# Patient Record
Sex: FEMALE | Race: WHITE
[De-identification: names, ages, dates, MRNs, and addresses within clinical notes are randomized per-mention and may not be internally consistent; named-entity substitution may affect disease eponyms.]

---

## 2017-07-08 NOTE — EDM.PDOC
ED HPI GENERAL MEDICAL PROBLEM





- General


Chief Complaint: General


Stated Complaint: FELL HURT HIP


Time Seen by Provider: 07/07/17 21:40


Source of Information: Reports: Patient, Family (Daughter Malena Samuel)


History Limitations: Reports: No Limitations





- History of Present Illness


INITIAL COMMENTS - FREE TEXT/NARRATIVE: 





fall, pain on left chest and abdomen; this is a 76 year old female, who reports 

fell earlier today at [employer], then while at home, trip and fell onto her left 

side on a large rock/boulder. The object has a pointer end, which is what Mrs. Reyna fell on. Reports "knocked the wind out of me".





denies any LOC


cc left chest and abdomen pain


walking without any difficulties.


Onset: Sudden


Duration: Constant


Location: Reports: Chest, Abdomen


Quality: Reports: Ache, Burning


Severity: Moderate


Improves with: Reports: Eating, Rest


Worsens with: Reports: None


Context: Reports: Trauma (fall two times, once at home and once at a store)


Associated Symptoms: Reports: No Other Symptoms


Treatments PTA: Reports: Acetaminophen


  ** left ribs and left hip


Pain Score (Numeric/FACES): 9





- Related Data


 Allergies











Allergy/AdvReac Type Severity Reaction Status Date / Time


 


dextrose [From Metamucil] Allergy  Cannot Verified 07/07/17 21:17





   Remember  


 


psyllium husk Allergy  Cannot Verified 07/07/17 21:17





[From Metamucil]   Remember  


 


psyllium seed Allergy  Cannot Verified 07/07/17 21:17





[From Metamucil]   Remember  


 


sucrose [From Metamucil] Allergy  Cannot Verified 07/07/17 21:17





   Remember  


 


Sulfa (Sulfonamide Allergy  Cannot Verified 07/07/17 21:17





Antibiotics)   Remember  











Home Meds: 


 Home Meds





DULoxetine HCl [Cymbalta] 30 mg PO DAILY 07/07/17 [History]


Exenatide Microspheres [Bydureon Pen] 2 mg SQ WEEKLY 07/07/17 [History]


Lisinopril/Hydrochlorothiazide [Lisinopril-Hctz 20-12.5 mg Tab] 1 each PO DAILY 

07/07/17 [History]


Omeprazole 40 mg PO DAILY 07/07/17 [History]


Polyethylene Glycol 3350 [MiraLAX] 1 dose PO ASDIRECTED PRN 07/07/17 [History]


atorvaSTATin [Lipitor] 20 mg PO ONETIME 07/07/17 [History]


metFORMIN [Glucophage XR] 500 mg PO DAILY 07/07/17 [History]











Past Medical History


HEENT History: Reports: Cataract


Cardiovascular History: Reports: High Cholesterol, Hypertension


Respiratory History: Reports: COPD


Gastrointestinal History: Reports: Cholelithiasis, GERD


Musculoskeletal History: Reports: Arthritis, Back Pain, Chronic, Fracture, 

Osteoporosis


Psychiatric History: Reports: Depression


Endocrine/Metabolic History: Reports: Diabetes, Type II





- Infectious Disease History


Infectious Disease History: Reports: C-Difficile, Herpes, Measles





- Past Surgical History


HEENT Surgical History: Reports: Cataract Surgery, Tonsillectomy


GI Surgical History: Reports: Appendectomy, Cholecystectomy


Female  Surgical History: Reports: Hysterectomy


Neurological Surgical History: Reports: Discectomy, Lumbar Spine, Spinal Fusion





Social & Family History





- Tobacco Use


Smoking Status *Q: Former Smoker


Used Tobacco, but Quit: Yes


Month Tobacco Last Used: 1981





- Caffeine Use


Caffeine Use: Reports: Coffee





- Recreational Drug Use


Recreational Drug Use: No





- Living Situation & Occupation


Living situation: Reports: 


Occupation: Retired ( to [employer], lives in [place].)





ED ROS GENERAL





- Review of Systems


Review Of Systems: See Below


Constitutional: Reports: Other (painful chest and abdomen.)


HEENT: Reports: No Symptoms


Respiratory: Reports: No Symptoms


Cardiovascular: Reports: No Symptoms


Endocrine: Reports: No Symptoms


GI/Abdominal: Reports: Abdominal Pain


: Reports: No Symptoms


Musculoskeletal: Reports: Back Pain (left chest and ribs), Muscle Pain, Muscle 

Stiffness


Skin: Reports: Bruising (left lateral abdoen)


Neurological: Reports: No Symptoms


Psychiatric: Reports: No Symptoms


Hematologic/Lymphatic: Reports: No Symptoms


Immunologic: Reports: No Symptoms





ED EXAM, GENERAL





- Physical Exam


Exam: See Below


Exam Limited By: Other (neat well groomed, dressed very nicely)


General Appearance: Alert, WD/WN, Moderate Distress


Eye Exam: Bilateral Eye: Periorbital Changes


Ears: Normal External Exam


Nose: Normal Inspection, Normal Mucosa, No Blood


Throat/Mouth: Normal Inspection, Normal Lips, Normal Teeth, Normal Gums, Normal 

Oropharynx, Normal Voice, No Airway Compromise


Head: Atraumatic, Normocephalic


Neck: Normal Inspection, Supple, Non-Tender, Full Range of Motion


Respiratory/Chest: Lungs Clear, Normal Breath Sounds, No Accessory Muscle Use, 

Other (left lower chest wall; ribs displacement noted to palpation)


Cardiovascular: Regular Rate, Rhythm, No Edema, No Gallop, No Murmur


Peripheral Pulses: 2+: Dorsalis Pedis (L), Dorsalis Pedis (R)


GI/Abdominal: Normal Bowel Sounds, Soft, No Distention, No Abnormal Bruit, No 

Mass, Pelvis Stable, Tender (over left lateral bruised area.)


 (Female) Exam: Deferred


Rectal (Female) Exam: Deferred


Back Exam: Normal Inspection


Extremities: Normal Inspection, Normal Range of Motion, Non-Tender, Normal 

Capillary Refill, No Pedal Edema


Psychiatric: Normal Affect, Normal Mood


Skin Exam: Warm





Course





- Vital Signs


Last Recorded V/S: 


 Last Vital Signs











Temp  36.2 C   07/07/17 21:15


 


Pulse  73   07/08/17 00:54


 


Resp  16   07/08/17 00:54


 


BP  108/43 L  07/08/17 00:54


 


Pulse Ox  95   07/08/17 00:54














- Orders/Labs/Meds


Orders: 


 Active Orders 24 hr











 Category Date Time Status


 


 Chest Abdomen Pelvis wo Cont [CT] Stat Exams  07/07/17 21:56 Taken


 


 Saline Lock Insert [OM.PC] Routine Oth  07/07/17 21:59 Ordered











Labs: 


 Laboratory Tests











  07/07/17 07/07/17 07/07/17 Range/Units





  22:12 22:12 22:53 


 


WBC  10.3    (4.5-11.0)  K/uL


 


RBC  4.28    (3.30-5.50)  M/uL


 


Hgb  11.8 L    (12.0-15.0)  g/dL


 


Hct  36.9    (36.0-48.0)  %


 


MCV  86    (80-98)  fL


 


MCH  28    (27-31)  pg


 


MCHC  32    (32-36)  %


 


Plt Count  287    (150-400)  K/uL


 


Neut % (Auto)  69 H    (36-66)  %


 


Lymph % (Auto)  20 L    (24-44)  %


 


Mono % (Auto)  8 H    (2-6)  %


 


Eos % (Auto)  3    (2-4)  %


 


Baso % (Auto)  1    (0-1)  %


 


Sodium   138 L   (140-148)  mmol/L


 


Potassium   3.2 L   (3.6-5.2)  mmol/L


 


Chloride   100   (100-108)  mmol/L


 


Carbon Dioxide   33 H   (21-32)  mmol/L


 


Anion Gap   8.2   (5.0-14.0)  mmol/L


 


BUN   20 H   (7-18)  mg/dL


 


Creatinine   1.1 H   (0.6-1.0)  mg/dL


 


Est Cr Clr Drug Dosing   40.09   mL/min


 


Estimated GFR (MDRD)   48 L   (>60)  


 


Glucose   165 H   ()  mg/dL


 


Calcium   9.3   (8.5-10.1)  mg/dL


 


Total Bilirubin   0.3   (0.2-1.0)  mg/dL


 


AST   15   (15-37)  U/L


 


ALT   19   (12-78)  U/L


 


Alkaline Phosphatase   76   ()  U/L


 


Total Protein   7.3   (6.4-8.2)  g/dL


 


Albumin   3.5   (3.4-5.0)  g/dL


 


Globulin   3.8 H   (2.3-3.5)  g/dL


 


Albumin/Globulin Ratio   0.9 L   (1.2-2.2)  


 


Urine Color    Yellow  


 


Urine Appearance    Slightly cloudy  


 


Urine pH    5.0  (4.5-8.0)  


 


Ur Specific Gravity    1.020  (1.008-1.030)  


 


Urine Protein    Negative  (NEGATIVE)  mg/dL


 


Urine Glucose (UA)    Normal  (NEGATIVE)  mg/dL


 


Urine Ketones    Negative  (NEGATIVE)  mg/dL


 


Urine Occult Blood    Negative  (NEGATIVE)  


 


Urine Nitrite    Negative  (NEGATIVE)  


 


Urine Bilirubin    Negative  (NEGATIVE)  


 


Urine Urobilinogen    Normal  (NORMAL)  mg/dL


 


Ur Leukocyte Esterase    Large  (NEGATIVE)  


 


Urine RBC    0-5  (0-5)  


 


Urine WBC    5-10 H  (0-5)  


 


Ur Epithelial Cells    Few  


 


Amorphous Sediment    Not seen  


 


Urine Bacteria    Not seen  


 


Urine Mucus    Moderate  











Meds: 


Medications














Discontinued Medications














Generic Name Dose Route Start Last Admin





  Trade Name Freq  PRN Reason Stop Dose Admin


 


Hydromorphone HCl  0.5 mg  07/07/17 22:55  07/07/17 23:11





  Dilaudid  IVPUSH  07/07/17 22:56  0.5 mg





  ONETIME ONE   Administration


 


Sodium Chloride  1,000 mls @ 250 mls/hr  07/07/17 22:00  07/07/17 22:47





  Normal Saline  IV   250 mls/hr





  ASDIRECTED FRANCES   Administration


 


Ondansetron HCl  4 mg  07/07/17 22:55  07/07/17 23:06





  Zofran  IVPUSH  07/07/17 22:56  4 mg





  ONETIME ONE   Administration


 


Sodium Chloride  10 ml  07/07/17 21:59  07/07/17 22:46





  Saline Flush  FLUSH   10 ml





  ASDIRECTED PRN   Administration





  Keep Vein Open   














- Radiology Interpretation


Free Text/Narrative:: 





Ct abdomen pelvis; no acute abdomen or pelvis,. minimally displaced poserior 

10th to 12 th left sided rib fractures





reveiwed CT report with Mrs. Reyna and her daughter, given copy of imaging 

and radiologist report, advised to follow up with Primary Care for recheck, not 

interested in admission to Hospital. 





Departure





- Departure


Time of Disposition: 00:55


Disposition: Home, Self-Care 01


Condition: Good


Clinical Impression: 


 Bruise of muscle, Hx of aortic aneurysm





Multiple fractures of ribs of left side


Qualifiers:


 Encounter type: initial encounter Fracture type: closed Qualified Code(s): 

S22.42XA - Multiple fractures of ribs, left side, initial encounter for closed 

fracture








- Discharge Information


Instructions:  Rib Fracture


Referrals: 


Dwayne Rodarte MD [Primary Care Provider] - 


Forms:  ED Department Discharge


Care Plan Goals: 


Multi rib fractures; 10th, 11th, 12th on left side, minimally displaced 

posterior


-dicussed coarse of rib fractures and healing


-medicate for pain; hydrocodone 5-325mg one every 4 to 6 hours as needed for 

pain #20


-medicate for pain and muscle spasm; Flexeril 10mg every 8 hours as needed for 

pain #15


-may apply ice to painful areas





Bruising to left lower abdomen/pelvis


-CT scan of chest-pelvis-abdomen; no acute abnormality of the chest, abdomen, 

and pelvis


-may apply ice for comfort


-medicate for pain





will need follow up next week in Primary Care for recheck


return to ER for any shortness of breath, fever,chills, increased pain or any 

concerns





Hx of Aneurysmal dilatation of the ascending thoracic aorta measuring 4.3 cm, 

this was noted on previous CT scan measuring 4 cm


-advise to follow up with Primary Care for recheck


-return to ER for any shortness of breath, fainting, chest pain, or any 

concerns.





- Problem List & Annotations


(1) Bruise of muscle


SNOMED Code(s): 287189597


   Code(s): T14.8 - OTHER INJURY OF UNSPECIFIED BODY REGION   Status: Acute   

Priority: Medium   





(2) Hx of aortic aneurysm


SNOMED Code(s): 854370468


   Code(s): Z86.79 - PERSONAL HISTORY OF OTHER DISEASES OF THE CIRCULATORY 

SYSTEM   Status: Acute   Priority: Medium   





(3) Multiple fractures of ribs of left side


SNOMED Code(s): 4075212


   Code(s): S22.42XA - MULTIPLE FRACTURES OF RIBS, LEFT SIDE, INIT FOR CLOS FX 

  Status: Acute   Priority: High   


Qualifiers: 


   Encounter type: initial encounter   Fracture type: closed   Qualified Code(s)

: S22.42XA - Multiple fractures of ribs, left side, initial encounter for 

closed fracture   





- My Orders


Last 24 Hours: 


My Active Orders





07/07/17 21:56


Chest Abdomen Pelvis wo Cont [CT] Stat 





07/07/17 21:59


Saline Lock Insert [OM.PC] Routine 














- Assessment/Plan


Last 24 Hours: 


My Active Orders





07/07/17 21:56


Chest Abdomen Pelvis wo Cont [CT] Stat 





07/07/17 21:59


Saline Lock Insert [OM.PC] Routine 











Plan: 





Multi rib fractures; 10th, 11th, 12th on left side, minimally displaced 

posterior


-dicussed coarse of rib fractures and healing


-medicate for pain; hydrocodone 5-325mg one every 4 to 6 hours as needed for 

pain #20


-medicate for pain and muscle spasm; Flexeril 10mg every 8 hours as needed for 

pain #15


-may apply ice to painful areas





Bruising to left lower abdomen/pelvis


-CT scan of chest-pelvis-abdomen; no acute abnormality of the chest, abdomen, 

and pelvis


-may apply ice for comfort


-medicate for pain





will need follow up next week in Primary Care for recheck


return to ER for any shortness of breath, fever,chills, increased pain or any 

concerns





Hx of Aneurysmal dilatation of the ascending thoracic aorta measuring 4.3 cm, 

this was noted on previous CT scan measuring 4 cm


-advise to follow up with Primary Care for recheck


-return to ER for any shortness of breath, fainting, chest pain, or any 

concerns.

## 2017-12-18 NOTE — EDM.PDOC
ED HPI GENERAL MEDICAL PROBLEM





- General


Chief Complaint: Eye Problems


Stated Complaint: CAME FROM EYE CLINIC


Time Seen by Provider: 12/18/17 16:40


Source of Information: Reports: Patient


History Limitations: Reports: No Limitations





- History of Present Illness


INITIAL COMMENTS - FREE TEXT/NARRATIVE: 





77-year-old female who has had some pain around her right eye for the past 

several days, diagnosed with likely shingles earlier today and sent over to the 

optometrist in case there was optic involvement. She does have some 

erythematous lesions around the eye, temporal area and right scalp. However 

while at the optometrist office she mentioned a brief loss of vision of the 

right eye several days ago, also temporal pain so there was a concern of 

temporal arteritis. She was sent over to the emergency room for evaluation and 

a sedimentation rate.


Onset: Gradual


Location: Reports: Head, Face


Severity: Moderate


Associated Symptoms: Reports: Headaches.  Denies: Fever/Chills, Malaise, Nausea/

Vomiting, Shortness of Breath, Weakness





- Related Data


 Allergies











Allergy/AdvReac Type Severity Reaction Status Date / Time


 


dextrose [From Metamucil] Allergy  Cannot Verified 07/07/17 21:17





   Remember  


 


psyllium husk Allergy  Cannot Verified 07/07/17 21:17





[From Metamucil]   Remember  


 


psyllium seed Allergy  Cannot Verified 07/07/17 21:17





[From Metamucil]   Remember  


 


sucrose [From Metamucil] Allergy  Cannot Verified 07/07/17 21:17





   Remember  


 


Sulfa (Sulfonamide Allergy  Difficulty Verified 12/18/17 16:41





Antibiotics)   Swallowing  











Home Meds: 


 Home Meds





DULoxetine HCl [Cymbalta] 30 mg PO DAILY 07/07/17 [History]


Exenatide Microspheres [Bydureon Pen] 2 mg SQ WEEKLY 07/07/17 [History]


Lisinopril/Hydrochlorothiazide [Lisinopril-Hctz 20-12.5 mg Tab] 1 each PO DAILY 

07/07/17 [History]


Omeprazole 40 mg PO DAILY 07/07/17 [History]


Polyethylene Glycol 3350 [MiraLAX] 1 dose PO ASDIRECTED PRN 07/07/17 [History]


atorvaSTATin [Lipitor] 20 mg PO ONETIME 07/07/17 [History]


metFORMIN [Glucophage XR] 500 mg PO DAILY 07/07/17 [History]











Past Medical History


HEENT History: Reports: Cataract


Cardiovascular History: Reports: High Cholesterol, Hypertension


Respiratory History: Reports: COPD


Gastrointestinal History: Reports: Cholelithiasis, GERD


OB/GYN History: Reports: Pregnancy


Musculoskeletal History: Reports: Arthritis, Back Pain, Chronic, Fracture, 

Osteoporosis


Neurological History: Reports: Neuropathy, Diabetic


Psychiatric History: Reports: Depression


Endocrine/Metabolic History: Reports: Diabetes, Type II





- Infectious Disease History


Infectious Disease History: Reports: C-Difficile, Herpes, Measles





- Past Surgical History


HEENT Surgical History: Reports: Cataract Surgery, Tonsillectomy


GI Surgical History: Reports: Appendectomy, Cholecystectomy


Female  Surgical History: Reports: Hysterectomy


Neurological Surgical History: Reports: Discectomy, Lumbar Spine, Spinal Fusion


Musculoskeletal Surgical History: Reports: Knee Replacement





Social & Family History





- Tobacco Use


Smoking Status *Q: Former Smoker


Used Tobacco, but Quit: Yes


Month Tobacco Last Used: 1981





- Caffeine Use


Caffeine Use: Reports: Coffee





- Alcohol Use


Days Per Week of Alcohol Use: 1


Number of Drinks Per Day: 1


Total Drinks Per Week: 1





- Recreational Drug Use


Recreational Drug Use: No





- Living Situation & Occupation


Living situation: Reports: 


Occupation: Retired ( to [employer], lives in [place].)





ED ROS GENERAL





- Review of Systems


Review Of Systems: See Below


Constitutional: Reports: Malaise.  Denies: Fever, Chills


HEENT: Reports: Eye Pain, Vision Change


Respiratory: Denies: Shortness of Breath


Cardiovascular: Denies: Chest Pain


Endocrine: Denies: Fatigue


GI/Abdominal: Denies: Abdominal Pain


Musculoskeletal: Denies: Neck Pain


Skin: Reports: Rash (Periorbital and right temporal and right scalp)


Neurological: Reports: Headache


Psychiatric: Reports: No Symptoms





ED EXAM GENERAL W FULL EYE





- Physical Exam


Exam: See Below


Exam Limited By: No Limitations


General Appearance: Alert, No Apparent Distress


Eye Exam: Bilateral Eye: EOMI, PERRL


Eyelids: Right: Other (Patient had a few raised erythematous lesions around her 

right eye)


Head: Other (Patient had several slightly raised tender erythematous lesions on 

the right side of the scalp extending into the right periorbital area)


Respiratory/Chest: No Respiratory Distress


Neurological: Alert, Oriented


Psychiatric: Normal Affect, Normal Mood


Skin Exam: Warm, Dry





Course





- Vital Signs


Last Recorded V/S: 


 Last Vital Signs











Temp  97.4 F   12/18/17 16:54


 


Pulse  94   12/18/17 16:54


 


Resp  18   12/18/17 16:54


 


BP  124/63   12/18/17 16:54


 


Pulse Ox  92 L  12/18/17 16:54














- Orders/Labs/Meds


Labs: 


 Laboratory Tests











  12/18/17 Range/Units





  17:15 


 


ESR  31 H  (0-25)  mm/hr














- Re-Assessments/Exams


Free Text/Narrative Re-Assessment/Exam: 





12/18/17 17:51


Sedimentation rate was obtained.


12/18/17 18:02


Sedimentation rate was only 31. This level is more typical of shingles then 

temporal arteritis. I encouraged her to just take her medications as prescribed 

and return if worsening.





Departure





- Departure


Time of Disposition: 18:09


Disposition: Home, Self-Care 01


Condition: Good


Clinical Impression: 


Shingles


Qualifiers:


 Herpes zoster complications: with ocular involvement Herpes zoster ocular 

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








- Discharge Information


Instructions:  Shingles, Easy-to-Read


Referrals: 


Dwayne Rodarte MD [Primary Care Provider] - 


Forms:  ED Department Discharge


Care Plan Goals: 


Take shingles medication as prescribed and return anytime if worsening or 

concerns.

## 2020-01-18 ENCOUNTER — HOSPITAL ENCOUNTER (EMERGENCY)
Dept: HOSPITAL 11 - JP.ED | Age: 80
Discharge: HOME | End: 2020-01-18
Payer: MEDICARE

## 2020-01-18 VITALS — SYSTOLIC BLOOD PRESSURE: 133 MMHG | HEART RATE: 92 BPM | DIASTOLIC BLOOD PRESSURE: 67 MMHG

## 2020-01-18 DIAGNOSIS — Z90.710: ICD-10-CM

## 2020-01-18 DIAGNOSIS — E11.40: ICD-10-CM

## 2020-01-18 DIAGNOSIS — S80.02XA: ICD-10-CM

## 2020-01-18 DIAGNOSIS — Z79.899: ICD-10-CM

## 2020-01-18 DIAGNOSIS — F32.9: ICD-10-CM

## 2020-01-18 DIAGNOSIS — Z79.84: ICD-10-CM

## 2020-01-18 DIAGNOSIS — Z98.890: ICD-10-CM

## 2020-01-18 DIAGNOSIS — Z88.2: ICD-10-CM

## 2020-01-18 DIAGNOSIS — Z98.49: ICD-10-CM
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
## 2020-01-18 DIAGNOSIS — I10: ICD-10-CM

## 2020-01-18 DIAGNOSIS — Z90.49: ICD-10-CM

## 2020-01-18 DIAGNOSIS — S93.402A: Primary | ICD-10-CM

## 2020-01-18 DIAGNOSIS — S86.812A: ICD-10-CM

## 2020-01-18 DIAGNOSIS — Z98.1: ICD-10-CM

## 2020-01-18 DIAGNOSIS — Z88.8: ICD-10-CM

## 2020-01-18 NOTE — CRLCR
Indication:



Injury and pain.



Technique:



Left knee 3 views 



Comparison:



None



Findings:



Bones: Alignment is normal. No fractures or bone lesions.  



Joint spaces: Moderate knee joint osteoarthritis. Large joint effusion is 

present. 



Soft tissues: Moderate atherosclerosis of the femoral artery. 



Impression:



Prominent joint effusion is present. No other sign of acute injury.



Dictated by Artur Olivera MD @ 1/18/2020 9:53:22 PM



Dictated by: Artur Olivera MD @ 01/18/2020 21:53:27



(Electronically Signed)

## 2020-01-18 NOTE — EDM.PDOC
ED HPI GENERAL MEDICAL PROBLEM





- General


Chief Complaint: Lower Extremity Injury/Pain


Stated Complaint: FELL,HURT BOTH LEGS


Time Seen by Provider: 01/18/20 20:40


Source of Information: Reports: Patient


History Limitations: Reports: No Limitations





- History of Present Illness


INITIAL COMMENTS - FREE TEXT/NARRATIVE: 





Patient presents for evaluation of left knee and ankle injuries after she 

slipped carrying a container of water for her dog. She was going down steps and 

somehow misplaced her foot or slipped on the surface of the step and fell down 

to the surface of the ground. The most noticeable area of injury is the medial 

portion of the left knee but she also has pain across the anterior portion of 

the left ankle. She was able to stand and bear weight on the leg but it did 

hurt she has left calf pain but only if she is standing upright. The right leg 

was not injured other than a small prepatellar abrasion. No other recognized 

injuries from this event tonight.


Onset: Today


Location: Reports: Lower Extremity, Left


Front/Back Body Image: 


  __________________________














  __________________________





 1 - Hematoma





 2 - Abrasion





 3 - Pain.





 4 - Calf pain.





Quality: Reports: Dull, Throbbing


Severity: Moderate


Improves with: Reports: None


Worsens with: Reports: Movement


Context: Reports: Activity


Associated Symptoms: Reports: No Other Symptoms


  ** left leg


Pain Score (Numeric/FACES): 2





- Related Data


 Allergies











Allergy/AdvReac Type Severity Reaction Status Date / Time


 


dextrose [From Metamucil] Allergy  Cannot Verified 01/18/20 20:31





   Remember  


 


psyllium husk Allergy  Cannot Verified 01/18/20 20:31





[From Metamucil]   Remember  


 


psyllium seed Allergy  Cannot Verified 01/18/20 20:31





[From Metamucil]   Remember  


 


sucrose [From Metamucil] Allergy  Cannot Verified 01/18/20 20:31





   Remember  


 


Sulfa (Sulfonamide Allergy  Difficulty Verified 01/18/20 20:31





Antibiotics)   Swallowing  











Home Meds: 


 Home Meds





DULoxetine HCl [Cymbalta] 30 mg PO DAILY 07/07/17 [History]


Exenatide Microspheres [Bydureon Pen] 2 mg SQ WEEKLY 07/07/17 [History]


Omeprazole 40 mg PO DAILY 07/07/17 [History]


Polyethylene Glycol 3350 [MiraLAX] 1 dose PO ASDIRECTED PRN 07/07/17 [History]


atorvaSTATin [Lipitor] 20 mg PO ONETIME 07/07/17 [History]


metFORMIN [Glucophage XR] 500 mg PO DAILY 07/07/17 [History]


Acyclovir [Zovirax 5% Oint] 1 applic TOP BID PRN 01/18/20 [History]


Losartan/Hydrochlorothiazide [Losartan-HCTZ 50-12.5 MG] 1 tab PO DAILY 01/18/20 

[History]











Past Medical History


HEENT History: Reports: Cataract


Cardiovascular History: Reports: High Cholesterol, Hypertension


Respiratory History: Reports: COPD


Gastrointestinal History: Reports: Cholelithiasis, GERD


OB/GYN History: Reports: Pregnancy


Musculoskeletal History: Reports: Arthritis, Back Pain, Chronic, Fracture, 

Osteoporosis


Neurological History: Reports: Neuropathy, Diabetic


Psychiatric History: Reports: Depression


Endocrine/Metabolic History: Reports: Diabetes, Type II





- Infectious Disease History


Infectious Disease History: Reports: C-Difficile, Herpes, Measles





- Past Surgical History


HEENT Surgical History: Reports: Cataract Surgery, Tonsillectomy


GI Surgical History: Reports: Appendectomy, Cholecystectomy


Female  Surgical History: Reports: Hysterectomy


Neurological Surgical History: Reports: Discectomy, Lumbar Spine, Spinal Fusion


Musculoskeletal Surgical History: Reports: Knee Replacement





Social & Family History





- Caffeine Use


Caffeine Use: Reports: Coffee





- Living Situation & Occupation


Living situation: Reports: 


Occupation: Retired ( to [employer], lives in [place].)





Review of Systems





- Review of Systems


Review Of Systems: Comprehensive ROS is negative, except as noted in HPI.


Musculoskeletal: Reports: Leg Pain, Foot Pain.  Denies: Shoulder Pain, Back Pain


Skin: Reports: Other (Superficial abrasions to both knees.)





ED EXAM, GENERAL





- Physical Exam


Exam: See Below


Exam Limited By: No Limitations


General Appearance: Mild Distress


Extremities: Joint Swelling (Medial aspect of the left knee. There is pain 

across the anterior portion of the left ankle joint. There is diffuse calf 

muscle tenderness in the left leg as well.), Leg Pain.  No: Increased Warmth, 

Mottled





Course





- Vital Signs


Last Recorded V/S: 


 Last Vital Signs











Temp  36.0 C   01/18/20 20:42


 


Pulse  92   01/18/20 20:42


 


Resp  14   01/18/20 20:42


 


BP  133/67   01/18/20 20:42


 


Pulse Ox  94 L  01/18/20 20:42














- Re-Assessments/Exams


Free Text/Narrative Re-Assessment/Exam: 





01/19/20 03:41


X-rays of left knee and left ankle ordered and reviewed by me shows no evidence 

of fracture. There is some degenerative change and soft tissue effusions. I 

recommend cold packs to painful areas 20 minutes off and on as needed. OTC pain 

medication as needed. She has a wheeled walker at home as well as a walking 

stick and I recommend she use those at all times when moving about at home. She 

mentions several episodes where she has fallen at home for a number of reasons. 

I referred to her wheeled walker as her "broken hip insurance policy." She 

understands.


01/19/20 03:42








Departure





- Departure


Time of Disposition: 22:24


Disposition: Home, Self-Care 01


Condition: Good


Clinical Impression: 


Contusion of knee, left


Qualifiers:


 Encounter type: initial encounter Qualified Code(s): S80.02XA - Contusion of 

left knee, initial encounter





Mild ankle sprain


Qualifiers:


 Encounter type: initial encounter Laterality: left Qualified Code(s): S93.402A 

- Sprain of unspecified ligament of left ankle, initial encounter





Strain of calf muscle


Qualifiers:


 Encounter type: initial encounter Laterality: left Qualified Code(s): S86.812A 

- Strain of other muscle(s) and tendon(s) at lower leg level, left leg, initial 

encounter








- Discharge Information


*PRESCRIPTION DRUG MONITORING PROGRAM REVIEWED*: Not Applicable


*COPY OF PRESCRIPTION DRUG MONITORING REPORT IN PATIENT LINDA: Not Applicable


Instructions:  Ankle Sprain, Easy-to-Read, Contusion, Easy-to-Read


Referrals: 


PCP,None [Primary Care Provider] - 


Forms:  ED Department Discharge


Additional Instructions: 


Apply cold packs 20 minutes off and on to painful areas. Tylenol as needed for 

pain. Use your wheeled walker at home to reduce your risk of falling and also 

reduce the risk of you're getting a broken hip! Return to ER if feeling worse 

in anyway.





Sepsis Event Note





- Focused Exam


Vital Signs: 


 Vital Signs











  Temp Pulse Resp BP Pulse Ox


 


 01/18/20 20:42  36.0 C  92  14  133/67  94 L


 


 01/18/20 20:29  36.0 C  92  14  133/67  94 L











Date Exam was Performed: 01/19/20


Time Exam was Performed: 03:39

## 2020-01-18 NOTE — CRLCR
Indication:



Injury and pain



Technique:



Left ankle 3 views.



Comparison:



None



Findings:



Bones: Alignment is normal. No fractures or bone lesions. Traction spurs 

present in the posterior calcaneus. 



Joint spaces: Unremarkable. 



Soft tissues: Unremarkable. 



Impression:



No sign of acute injury.



Dictated by: Artur Olivera MD @ 01/18/2020 21:51:56



(Electronically Signed)

## 2020-02-28 ENCOUNTER — HOSPITAL ENCOUNTER (EMERGENCY)
Dept: HOSPITAL 11 - JP.ED | Age: 80
Discharge: HOME | End: 2020-02-28
Payer: MEDICARE

## 2020-02-28 VITALS — HEART RATE: 96 BPM | DIASTOLIC BLOOD PRESSURE: 63 MMHG | SYSTOLIC BLOOD PRESSURE: 128 MMHG

## 2020-02-28 DIAGNOSIS — F32.9: ICD-10-CM

## 2020-02-28 DIAGNOSIS — Z88.2: ICD-10-CM

## 2020-02-28 DIAGNOSIS — Z79.84: ICD-10-CM

## 2020-02-28 DIAGNOSIS — K21.9: ICD-10-CM

## 2020-02-28 DIAGNOSIS — E78.00: ICD-10-CM

## 2020-02-28 DIAGNOSIS — E11.40: ICD-10-CM

## 2020-02-28 DIAGNOSIS — G45.9: Primary | ICD-10-CM

## 2020-02-28 DIAGNOSIS — I10: ICD-10-CM

## 2020-02-28 DIAGNOSIS — Z79.899: ICD-10-CM

## 2020-02-28 DIAGNOSIS — Z87.891: ICD-10-CM

## 2020-02-28 DIAGNOSIS — J44.9: ICD-10-CM

## 2020-02-28 PROCEDURE — 70450 CT HEAD/BRAIN W/O DYE: CPT

## 2020-02-28 PROCEDURE — 84484 ASSAY OF TROPONIN QUANT: CPT

## 2020-02-28 PROCEDURE — 36415 COLL VENOUS BLD VENIPUNCTURE: CPT

## 2020-02-28 PROCEDURE — 99285 EMERGENCY DEPT VISIT HI MDM: CPT

## 2020-02-28 PROCEDURE — 85027 COMPLETE CBC AUTOMATED: CPT

## 2020-02-28 PROCEDURE — 80048 BASIC METABOLIC PNL TOTAL CA: CPT

## 2020-02-28 PROCEDURE — 99284 EMERGENCY DEPT VISIT MOD MDM: CPT

## 2020-02-28 NOTE — EDM.PDOC
ED HPI GENERAL MEDICAL PROBLEM





- General


Chief Complaint: Neurological Problem


Stated Complaint: confused hard time talking headache


Time Seen by Provider: 02/28/20 21:15


Source of Information: Reports: Patient, Family, Old Records, RN


History Limitations: Reports: No Limitations





- History of Present Illness


INITIAL COMMENTS - FREE TEXT/NARRATIVE: 





78 yo female here with several weeks of HA's that are not normal for her and a 

spell of about 40 min duration before arrival of not being able to say what she 

wanted to say. Sx's of word finding deficit are resolved on arrival. Also 

mentions a new tremor in the past couple of weeks, L hand greater than R. Has 

not been to her provider for any of these sx's. 


Onset: Today (wording finding difficulty)


Onset Date: 02/28/20


Duration: Minutes: (40), Resolved Prior to Arrival


Location: Reports: Head


Quality: Reports: Ache (headache for several weeks)


Severity: Moderate


Improves with: Reports: Medication


Worsens with: Reports: Other (unknown)


Context: Reports: Other (see HPI)


Associated Symptoms: Reports: Confusion (transient today only), Other (tremor).

  Denies: Syncope


Treatments PTA: Reports: Other (see below) (none)


  ** Left Upper Temporal Headache


Pain Score (Numeric/FACES): 10





- Related Data


 Allergies











Allergy/AdvReac Type Severity Reaction Status Date / Time


 


dextrose [From Metamucil] Allergy  Cannot Verified 02/28/20 21:06





   Remember  


 


psyllium husk Allergy  Cannot Verified 02/28/20 21:06





[From Metamucil]   Remember  


 


psyllium seed Allergy  Cannot Verified 02/28/20 21:06





[From Metamucil]   Remember  


 


sucrose [From Metamucil] Allergy  Cannot Verified 02/28/20 21:06





   Remember  


 


Sulfa (Sulfonamide Allergy  Difficulty Verified 02/28/20 21:06





Antibiotics)   Swallowing  











Home Meds: 


 Home Meds





DULoxetine HCl [Cymbalta] 30 mg PO DAILY 07/07/17 [History]


Exenatide Microspheres [Bydureon Pen] 2 mg SQ WEEKLY 07/07/17 [History]


Omeprazole 40 mg PO DAILY 07/07/17 [History]


atorvaSTATin [Lipitor] 20 mg PO ONETIME 07/07/17 [History]


metFORMIN [Glucophage XR] 500 mg PO DAILY 07/07/17 [History]


polyethylene glycoL 3350 [MiraLAX] 1 dose PO ASDIRECTED PRN 07/07/17 [History]


Acyclovir [Zovirax 5% Oint] 1 applic TOP BID PRN 01/18/20 [History]


Losartan/Hydrochlorothiazide [Losartan-HCTZ 50-12.5 MG] 1 tab PO DAILY 01/18/20 

[History]











Past Medical History


HEENT History: Reports: Cataract


Cardiovascular History: Reports: High Cholesterol, Hypertension


Respiratory History: Reports: COPD


Gastrointestinal History: Reports: Cholelithiasis, GERD


OB/GYN History: Reports: Pregnancy


Musculoskeletal History: Reports: Arthritis, Back Pain, Chronic, Fracture, 

Osteoporosis


Neurological History: Reports: Neuropathy, Diabetic


Psychiatric History: Reports: Depression


Endocrine/Metabolic History: Reports: Diabetes, Type II





- Infectious Disease History


Infectious Disease History: Reports: Chicken Pox, Measles, Mumps, Shingles





- Past Surgical History


HEENT Surgical History: Reports: Cataract Surgery, Tonsillectomy


GI Surgical History: Reports: Appendectomy, Cholecystectomy


Female  Surgical History: Reports: Hysterectomy


Neurological Surgical History: Reports: Discectomy, Lumbar Spine, Spinal Fusion


Musculoskeletal Surgical History: Reports: Knee Replacement





Social & Family History





- Tobacco Use


Smoking Status *Q: Former Smoker


Used Tobacco, but Quit: Yes


Month/Year Tobacco Last Used: 1982





- Caffeine Use


Caffeine Use: Reports: Coffee


Caffeine Use Comment: a pot of coffee per day





- Alcohol Use


Days Per Week of Alcohol Use: 3


Number of Drinks Per Day: 2


Total Drinks Per Week: 6





- Recreational Drug Use


Recreational Drug Use: No





- Living Situation & Occupation


Living situation: Reports: 


Occupation: Retired ( to [employer], lives in [place].)





ED ROS GENERAL





- Review of Systems


Review Of Systems: See Below


Constitutional: Reports: No Symptoms


HEENT: Reports: No Symptoms


Respiratory: Reports: No Symptoms


Cardiovascular: Reports: No Symptoms


GI/Abdominal: Reports: No Symptoms


: Reports: No Symptoms


Musculoskeletal: Reports: No Symptoms


Skin: Reports: No Symptoms


Neurological: Reports: Confusion (transient today, resolved), Headache (for a 

few weeks), Trouble Speaking (hard to say what she wanted to say, now resolved.)


Psychiatric: Reports: No Symptoms





ED EXAM, NEURO





- Physical Exam


Exam: See Below


Exam Limited By: No Limitations


General Appearance: Alert, WD/WN, No Apparent Distress


Eye Exam: Bilateral Eye: EOMI, Normal Inspection, PERRL


Ears: Normal External Exam, Normal Canal, Hearing Grossly Normal, Normal TMs


Nose: Normal Inspection, No Blood


Throat/Mouth: Normal Inspection, Normal Lips, Normal Oropharynx, Normal Voice, 

No Airway Compromise


Head Exam: Atraumatic, Normocephalic


Neck: Normal Inspection


Respiratory/Chest: No Respiratory Distress, Lungs Clear, Normal Breath Sounds, 

No Accessory Muscle Use


Cardiovascular: Regular Rate, Rhythm, No Edema


GI/Abdominal: Normal Bowel Sounds, Soft, Non-Tender, No Distention


Neurological: Alert, Normal Mood/Affect, CN II-XII Intact, No Motor/Sensory 

Deficits, Oriented x 3


Back Exam: Normal Inspection.  No: CVA Tenderness (R), CVA Tenderness (L)


Extremities: Normal Inspection, Normal Range of Motion, Non-Tender, No Pedal 

Edema


Psychiatric: Normal Affect, Normal Mood


Skin Exam: Warm, Dry, Intact, Normal Color, No Rash





Course





- Vital Signs


Last Recorded V/S: 


 Last Vital Signs











Temp  35.8 C L  02/28/20 21:16


 


Pulse  96   02/28/20 21:16


 


Resp  18   02/28/20 21:16


 


BP  128/63   02/28/20 21:16


 


Pulse Ox  98   02/28/20 21:16














- Orders/Labs/Meds


Orders: 


 Active Orders 24 hr











 Category Date Time Status


 


 UA W/MICROSCOPIC [URIN] Stat Lab  02/28/20 21:29 Ordered











Labs: 


 Laboratory Tests











  02/28/20 02/28/20 Range/Units





  21:41 21:41 


 


WBC  8.2   (4.5-11.0)  K/uL


 


RBC  4.35   (3.30-5.50)  M/uL


 


Hgb  11.3 L   (12.0-15.0)  g/dL


 


Hct  37.5   (36.0-48.0)  %


 


MCV  86   (80-98)  fL


 


MCH  26 L   (27-31)  pg


 


MCHC  30 L   (32-36)  %


 


Plt Count  309   (150-400)  K/uL


 


Sodium   141  (140-148)  mmol/L


 


Potassium   3.5 L  (3.6-5.2)  mmol/L


 


Chloride   101  (100-108)  mmol/L


 


Carbon Dioxide   30  (21-32)  mmol/L


 


Anion Gap   13.5  (5.0-14.0)  mmol/L


 


BUN   15  (7-18)  mg/dL


 


Creatinine   0.9  (0.6-1.0)  mg/dL


 


Est Cr Clr Drug Dosing   45.77  mL/min


 


Estimated GFR (MDRD)   > 60  (>60)  


 


Glucose   115 H  ()  mg/dL


 


Calcium   9.5  (8.5-10.1)  mg/dL


 


Troponin I   < 0.017  (0.000-0.056)  ng/mL











Meds: 


Medications














Discontinued Medications














Generic Name Dose Route Start Last Admin





  Trade Name Freq  PRN Reason Stop Dose Admin


 


Aspirin  324 mg  02/28/20 21:18  02/28/20 21:27





  Aspirin  PO  02/28/20 21:19  324 mg





  ONETIME ONE   Administration





     





     





     





     














- Radiology Interpretation


Free Text/Narrative:: 





Head CT scan-negative


CT Results Date: 02/28/20





- Re-Assessments/Exams


Free Text/Narrative Re-Assessment/Exam: 





02/28/20 22:53


Doing well, does not want hospitalization at this time. 





Departure





- Departure


Time of Disposition: 23:00


Disposition: Home, Self-Care 01


Condition: Fair


Clinical Impression: 


 TIA (transient ischemic attack)








- Discharge Information


*PRESCRIPTION DRUG MONITORING PROGRAM REVIEWED*: No


*COPY OF PRESCRIPTION DRUG MONITORING REPORT IN PATIENT LINDA: No


Referrals: 


PCP,None [Primary Care Provider] - 


Forms:  ED Department Discharge


Additional Instructions: 


Take a baby aspirin daily with food. Follow up with your provider early next 

week to get scheduled for carotid ultrasounds, echocardiogram, and potentially 

other tests to work up the cause of what happened today. 





Return if worse. 





Sepsis Event Note





- Evaluation


Sepsis Screening Result: No Definite Risk





- Focused Exam


Vital Signs: 


 Vital Signs











  Temp Pulse Resp BP Pulse Ox


 


 02/28/20 21:16  35.8 C L  96  18  128/63  98











Date Exam was Performed: 02/28/20


Time Exam was Performed: 22:50





- My Orders


Last 24 Hours: 


My Active Orders





02/28/20 21:29


UA W/MICROSCOPIC [URIN] Stat 














- Assessment/Plan


Last 24 Hours: 


My Active Orders





02/28/20 21:29


UA W/MICROSCOPIC [URIN] Stat

## 2020-02-28 NOTE — CRLCT
INDICATION:



Headache.



TECHNIQUE:



Noncontrast axial images. Coronal reconstructions.



COMPARISON:



None.



FINDINGS:



There is no abnormal intracranial mass effect or midline shift. No 

intracranial hemorrhage. Mild periventricular white matter changes likely 

related to chronic small vessel disease. A few small remote lacunar 

infarcts are seen in the basal ganglia and thalami. CSF spaces are 

age-appropriate.



No acute osseous abnormality.



Visualized portions of the paranasal sinuses and mastoids are clear.



IMPRESSION:



No CT evidence of an acute intracranial abnormality.



Dictated by Jean Claude Gupta MD @ 2/28/2020 10:46:22 PM



Please note that all CT scans at this facility use dose modulation, 

iterative reconstruction, and/or weight-based dosing when appropriate to 

reduce radiation dose to as low as reasonably achievable.



Dictated by: Jean Claude Gupta MD @ 02/28/2020 22:46:32



(Electronically Signed)

## 2021-10-27 ENCOUNTER — HOSPITAL ENCOUNTER (EMERGENCY)
Dept: HOSPITAL 11 - JP.ED | Age: 81
Discharge: HOME | End: 2021-10-27
Payer: MEDICARE

## 2021-10-27 VITALS — SYSTOLIC BLOOD PRESSURE: 162 MMHG | HEART RATE: 85 BPM | DIASTOLIC BLOOD PRESSURE: 76 MMHG

## 2021-10-27 DIAGNOSIS — Z91.018: ICD-10-CM

## 2021-10-27 DIAGNOSIS — D64.9: ICD-10-CM

## 2021-10-27 DIAGNOSIS — K21.9: ICD-10-CM

## 2021-10-27 DIAGNOSIS — Z79.84: ICD-10-CM

## 2021-10-27 DIAGNOSIS — E87.1: ICD-10-CM

## 2021-10-27 DIAGNOSIS — Z79.899: ICD-10-CM

## 2021-10-27 DIAGNOSIS — E11.40: ICD-10-CM
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
## 2021-10-27 DIAGNOSIS — E11.65: ICD-10-CM
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
## 2021-10-27 DIAGNOSIS — E86.0: ICD-10-CM

## 2021-10-27 DIAGNOSIS — J44.9: ICD-10-CM

## 2021-10-27 DIAGNOSIS — Z87.891: ICD-10-CM

## 2021-10-27 DIAGNOSIS — S00.31XA: Primary | ICD-10-CM

## 2021-10-27 DIAGNOSIS — R42: ICD-10-CM

## 2021-10-27 DIAGNOSIS — I10: ICD-10-CM

## 2021-10-27 DIAGNOSIS — E87.6: ICD-10-CM

## 2021-10-27 DIAGNOSIS — Z88.2: ICD-10-CM

## 2021-10-27 DIAGNOSIS — E78.00: ICD-10-CM

## 2021-10-27 NOTE — EDM.PDOC
ED HPI GENERAL MEDICAL PROBLEM





- General


Chief Complaint: Head Injury


Stated Complaint: HIT HEAD


Time Seen by Provider: 10/27/21 22:17


Source of Information: Reports: Patient, Family (spouse & daughter at bedside)


History Limitations: Reports: No Limitations





- History of Present Illness


INITIAL COMMENTS - FREE TEXT/NARRATIVE: 





This emergency room today secondary to sensation of blacking out dizziness that 

occurred when she was at the computer this evening she said she had tunneling of

her vision and became dizzy she did get out on the down on the floor out of the 

chair so that she would not pass out and fall she states that she then crawled 

to the doorway and yelled for her  for some help who then called her 

daughter patient states that she was dizzy and vomited x3 is also clammy in 

nature during acute episode.  Patient states now in the emergency the room the 

symptoms have almost resolved.  She denies any other symptoms no chest pain 

discomfort shortness of breath difficulty breathing or concerns otherwise denies

any headache or visual changes at this time.  Patient also reports that she fell

while she was outside a couple of days ago hitting the bridge of her nose as 

well as the left side of her head on the edge of the sidewalk she is not sure 

exactly what happened but she was out using the leaf blower blowing leaves when 

she tripped apparently over the sidewalk edge.  She denies any loss of 

consciousness at that time and she states that she has been fine since that fall

event occurred in fact today she states that she cleaned house without any d

ifficulty.  Patient has been eating her normal diet having her normal activity. 

Patient states that she has a new provider appointment on 1 November that is 

already scheduled and had labs done last week for this





PMH--DM2 (oral), HLP, HTN, GERD, constipation, cold sores, hx CVA, peripheral 

neuropathy, chronic dizzy episodes/vertigo





Meds--Metformin, atorvastatin, omeprazole, losartan/HCT, Cymbalta, Zovirax 

ointment as needed, Plavix, baby aspirin, MiraLAX,bidurian injection every 

couple weeks, meclizine prn





Allergies reviewed in EMR (sulfa, metamucil)





Tob--former


EtOH--occ glass of wine


Drugs--denies





Denies COVID infection history, has received COVID immunization (Pfizer)/needs 

booster still; has received her annual influenza vaccination for this season 

(21-22)





- Related Data


                                    Allergies











Allergy/AdvReac Type Severity Reaction Status Date / Time


 


dextrose [From Metamucil] Allergy  Cannot Verified 10/27/21 22:18





   Remember  


 


psyllium husk Allergy  Cannot Verified 10/27/21 22:18





[From Metamucil]   Remember  


 


psyllium seed Allergy  Cannot Verified 10/27/21 22:18





[From Metamucil]   Remember  


 


sucrose [From Metamucil] Allergy  Cannot Verified 10/27/21 22:18





   Remember  


 


Sulfa (Sulfonamide Allergy  Difficulty Verified 10/27/21 22:18





Antibiotics)   Swallowing  











Home Meds: 


                                    Home Meds





DULoxetine HCl [Cymbalta] 30 mg PO DAILY 07/07/17 [History]


Exenatide Microspheres [Bydureon Pen] 2 mg SQ WEEKLY 07/07/17 [History]


Omeprazole 40 mg PO DAILY 07/07/17 [History]


atorvaSTATin [Lipitor] 20 mg PO ONETIME 07/07/17 [History]


metFORMIN [Glucophage XR] 500 mg PO DAILY 07/07/17 [History]


polyethylene glycoL 3350 [MiraLAX] 1 dose PO ASDIRECTED PRN 07/07/17 [History]


Acyclovir [Zovirax 5% Oint] 1 applic TOP BID PRN 01/18/20 [History]


Losartan/Hydrochlorothiazide [Losartan-HCTZ 50-12.5 MG] 1 tab PO DAILY 01/18/20 

[History]


Clopidogrel [Plavix] 1 tab PO DAILY 10/27/21 [History]











Past Medical History


HEENT History: Reports: Cataract


Cardiovascular History: Reports: High Cholesterol, Hypertension


Respiratory History: Reports: COPD


Gastrointestinal History: Reports: Cholelithiasis, GERD


OB/GYN History: Reports: Pregnancy


Musculoskeletal History: Reports: Arthritis, Back Pain, Chronic, Fracture, 

Osteoporosis


Neurological History: Reports: Neuropathy, Diabetic


Psychiatric History: Reports: Depression


Endocrine/Metabolic History: Reports: Diabetes, Type II





- Infectious Disease History


Infectious Disease History: Reports: Chicken Pox, Measles, Mumps, Shingles





- Past Surgical History


HEENT Surgical History: Reports: Cataract Surgery, Tonsillectomy


GI Surgical History: Reports: Appendectomy, Cholecystectomy


Female  Surgical History: Reports: Hysterectomy


Neurological Surgical History: Reports: Discectomy, Lumbar Spine, Spinal Fusion


Musculoskeletal Surgical History: Reports: Knee Replacement





Social & Family History





- Tobacco Use


Tobacco Use Status *Q: Former Tobacco User


Used Tobacco, but Quit: Yes


Month/Year Tobacco Last Used: 1/2000





- Caffeine Use


Caffeine Use: Reports: Coffee


Caffeine Use Comment: a pot of coffee per day





- Recreational Drug Use


Recreational Drug Use: No





- Living Situation & Occupation


Living situation: Reports: 


Occupation: Retired ( to [employer], lives in [place].)





ED ROS GENERAL





- Review of Systems


Review Of Systems: Comprehensive ROS is negative, except as noted in HPI.


Constitutional: Reports: No Symptoms


HEENT: Reports: No Symptoms


Respiratory: Reports: No Symptoms


Cardiovascular: Reports: No Symptoms.  Denies: Chest Pain


GI/Abdominal: Reports: Nausea, Vomiting.  Denies: Abdominal Pain


: Reports: No Symptoms


Musculoskeletal: Reports: No Symptoms


Skin: Reports: No Symptoms


Neurological: Reports: Dizziness.  Denies: Confusion, Headache, Numbness, P

aresthesia, Syncope, Trouble Speaking, Change in Speech





ED EXAM, HEAD INJURY





- Physical Exam


Exam: See Below


Exam Limited By: No Limitations


General Appearance: Alert, WD/WN, No Apparent Distress


Head: Normocephalic, Facial Abrasions (bridge of nose from fall several days 

ago)


Nexus Criteria: No: Posterior, Midline Cervical Tenderness, Evidence of 

Intoxication, Altered Level of Consciousness, Focal Neurological Deficit, 

Painful Distraction Injuries


Eyes: Bilateral Eye: EOMI, Normal Inspection, PERRL


Ears: Normal External Exam, Hearing Grossly Normal


Nose: Other (scab/abrasion on bridge of nose from fall several days ago)


Throat/Mouth: Normal Inspection, Normal Voice, No Airway Compromise


Neck: Non-Tender, Full Range of Motion, Normal Alignment, Normal Inspection


Respiratory: No Respiratory Distress, Lungs Clear, Normal Breath Sounds, Chest 

Non-Tender


Cardiovascular: Normal Peripheral Pulses, Regular Rate, Rhythm, No Edema, No 

Murmur


GI/Abdominal Exam: Normal Bowel Sounds, Soft, Non-Tender


 (Female) Exam: Deferred


Rectal (Female) Exam: Deferred


Back Exam: Normal Inspection, Full Range of Motion


Extremities: Normal Inspection, Normal Range of Motion, No Pedal Edema, Normal 

Capillary Refill


Neurologic: No Motor/Sensory Deficits, Alert, Normal Mood/Affect, Oriented x 3. 

 No: Abnormal CNs II-XII


Skin: Normal Color, Warm/Dry





- [place] Coma Score


Best Eye Response ([place]): (4) Open Spontaneously


Best Verbal Response ([place]): (5) Oriented


Best Motor Response ([place]): (6) Obeys Commands


  ** #1 Interpretation


EKG Date: 10/27/21


Time: 22:41 (2250)


Rhythm: NSR (With PVC, with first-degree AV block)


Rate (Beats/Min): 81


Axis: Normal


P-Wave: Present (, left atrial enlargement, low voltage limb leads)


QRS: Normal ([id number])


ST-T: Normal


QT: Prolonged (Prolonged QT, QT/[id number])





Course





- Vital Signs


Text/Narrative:: 





2340--in room to discuss with patient and family today's ER findings to include 

mild anemia mild hypokalemia mild dehydration and hyperglycemia in addition to 

chronic vertigo recommend that she increase her fluids to stay well-hydrated 

drinking water or juice or sports drinks of choice as recommended to avoid high 

sugar drinks using the low or no sugar varieties.  Follow-up with primary care 

provider as already indicated and will provide a prescription for meclizine as 

she states she has run out at this time.  She and family verbalized 

understanding agree with plan of care ready for discharge


Last Recorded V/S: 


                                Last Vital Signs











Temp  97.6 F   10/27/21 22:15


 


Pulse  85   10/27/21 22:15


 


Resp  16   10/27/21 22:15


 


BP  162/76 H  10/27/21 22:15


 


Pulse Ox  97   10/27/21 22:15














- Orders/Labs/Meds


Orders: 


                               Active Orders 24 hr











 Category Date Time Status


 


 Head wo Cont [CT] Stat Exams  10/27/21 22:34 Taken


 


 EKG 12 Lead [EK] Routine Ther  10/27/21 22:34 Ordered











Labs: 


                                Laboratory Tests











  10/27/21 10/27/21 Range/Units





  22:54 22:54 


 


WBC  9.3   (4.5-11.0)  K/uL


 


RBC  4.22   (3.30-5.50)  M/uL


 


Hgb  11.5 L   (12.0-15.0)  g/dL


 


Hct  35.9 L   (36.0-48.0)  %


 


MCV  85   (80-98)  fL


 


MCH  27   (27-31)  pg


 


MCHC  32   (32-36)  %


 


Plt Count  303   (150-400)  K/uL


 


Neut % (Auto)  76.8 H   (36-66)  %


 


Lymph % (Auto)  13.1 L   (24-44)  %


 


Mono % (Auto)  7.6 H   (2-6)  %


 


Eos % (Auto)  2.1   (2-4)  %


 


Baso % (Auto)  0.4   (0-1)  %


 


Sodium   137 L  (140-148)  mmol/L


 


Potassium   3.3 L  (3.6-5.2)  mmol/L


 


Chloride   98 L  (100-108)  mmol/L


 


Carbon Dioxide   30  (21-32)  mmol/L


 


Anion Gap   12.3  (5.0-14.0)  mmol/L


 


BUN   19 H  (7-18)  mg/dL


 


Creatinine   1.2 H  (0.6-1.0)  mg/dL


 


Est Cr Clr Drug Dosing   33.18  mL/min


 


Estimated GFR (MDRD)   43 L  (>60)  


 


Glucose   170 H  ()  mg/dL


 


Calcium   9.2  (8.5-10.1)  mg/dL


 


Total Bilirubin   0.3  (0.2-1.0)  mg/dL


 


AST   13 L  (15-37)  U/L


 


ALT   19  (12-78)  U/L


 


Alkaline Phosphatase   83  ()  U/L


 


Total Protein   7.5  (6.4-8.2)  g/dL


 


Albumin   3.5  (3.4-5.0)  g/dL


 


Globulin   4.0 H  (2.3-3.5)  g/dL


 


Albumin/Globulin Ratio   0.9 L  (1.2-2.2)  














Departure





- Departure


Time of Disposition: 23:41


Disposition: Home, Self-Care 01


Clinical Impression: 


 Dizzy spells, Fall on same level from slipping, tripping and stumbling with 

subsequent striking against furniture, initial encounter, Hypertension, Mild 

anemia, Hypokalemia, Dehydration with hyponatremia, Type 2 diabetes mellitus 

with hyperglycemia, without long-term current use of insulin





Abrasion of nose


Qualifiers:


 Encounter type: initial encounter Qualified Code(s): S00.31XA - Abrasion of 

nose, initial encounter





Head injury


Qualifiers:


 Encounter type: initial encounter Qualified Code(s): S09.90XA - Unspecified 

injury of head, initial encounter








- Discharge Information


*PRESCRIPTION DRUG MONITORING PROGRAM REVIEWED*: Not Applicable


*COPY OF PRESCRIPTION DRUG MONITORING REPORT IN PATIENT LINDA: Not Applicable


Instructions:  Fall Prevention in the Home, Adult, Easy-to-Read, Post-Concussion

 Syndrome, Easy-to-Read, Vertigo, Easy-to-Read, How to Perform the Epley 

Maneuver, Head Injury, Adult, Easy-to-Read, Anemia, Hypokalemia, How to Take 

Your Blood Pressure, Dehydration, Adult, Easy-to-Read, Hyperglycemia, 

Easy-to-Read


Referrals: 


PCP,None [Primary Care Provider] - 


Forms:  ED Department Discharge


Additional Instructions: 


Ensure you are drinking plenty of fluids--water, juice, sports drinks of choice 

(avoid drinks high in sugar due to your diabetes)





Follow up with your PCM as already scheduled on 1 Nov to follow up today's ER 

visit/concerns and ongoing chronic medical care needs





Sepsis Event Note (ED)





- Evaluation


Sepsis Screening Result: No Definite Risk





- Focused Exam


Vital Signs: 


                                   Vital Signs











  Temp Pulse Resp BP Pulse Ox


 


 10/27/21 22:15  97.6 F  85  16  162/76 H  97














- My Orders


Last 24 Hours: 


My Active Orders





10/27/21 22:34


Head wo Cont [CT] Stat 


EKG 12 Lead [EK] Routine 














- Assessment/Plan


Last 24 Hours: 


My Active Orders





10/27/21 22:34


Head wo Cont [CT] Stat 


EKG 12 Lead [EK] Routine

## 2021-10-28 NOTE — CRLCT
For Patients:  As a result of the 21st Century Cures Act, medical imaging 

exams and procedure reports are released immediately into your electronic 

medical record.  You may view this report before your referring provider.  

If you have questions, please contact your health care provider.



INDICATION: FELL 2 DAYS AGO, DIZZY EPISODE TODAY



CT HEAD WITHOUT CONTRAST



TECHNIQUE:  Multiple axial CT images were performed through the head 

without intravenous contrast administration.



COMPARISON:  2/28/2020 head CT.



FINDINGS:  No acute intracranial hemorrhage is identified.  No extra-axial 

collections are evident and there is no mass effect or midline shift.  

There is mild diffuse age-related brain atrophy.  Ventricular size and 

configuration are within normal limits for the patient`s age.  Gray-white 

differentiation is within normal limits.  There is unchanged mild patchy 

hypodensity in the periventricular white matter, a nonspecific finding 

which most likely reflects chronic small vessel ischemic change.  Osseous 

structures are within normal limits and no fractures are seen.  Included 

portions of the paranasal sinuses and mastoid air cells are normally 

aerated.



IMPRESSION:  



1. No acute intracranial abnormality identified.



2. Mild age-related brain atrophy and white matter hypodensity consistent 

with chronic small vessel ischemic change.    



LEROY PENN MD



[facility]



Please note that all CT scans at this facility use dose modulation, 

iterative reconstruction, and/or weight-based dosing when appropriate to 

reduce radiation dose to as low as reasonably achievable.



Dictated by: Nestor Penn MD @ 10/28/2021 00:40:45



(Electronically Signed)